# Patient Record
Sex: MALE | Race: WHITE
[De-identification: names, ages, dates, MRNs, and addresses within clinical notes are randomized per-mention and may not be internally consistent; named-entity substitution may affect disease eponyms.]

---

## 2017-02-22 ENCOUNTER — HOSPITAL ENCOUNTER (EMERGENCY)
Dept: HOSPITAL 61 - ER | Age: 57
Discharge: HOME | End: 2017-02-22
Payer: COMMERCIAL

## 2017-02-22 VITALS — SYSTOLIC BLOOD PRESSURE: 120 MMHG | DIASTOLIC BLOOD PRESSURE: 77 MMHG

## 2017-02-22 VITALS — WEIGHT: 195 LBS | BODY MASS INDEX: 27.3 KG/M2 | HEIGHT: 71 IN
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
## 2017-02-22 DIAGNOSIS — Z88.5: ICD-10-CM
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
## 2017-02-22 DIAGNOSIS — J45.909: ICD-10-CM

## 2017-02-22 DIAGNOSIS — I10: ICD-10-CM
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
## 2017-02-22 DIAGNOSIS — J44.9: ICD-10-CM
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
## 2017-02-22 DIAGNOSIS — F12.10: ICD-10-CM

## 2017-02-22 DIAGNOSIS — M70.32: Primary | ICD-10-CM

## 2017-02-22 PROCEDURE — 73080 X-RAY EXAM OF ELBOW: CPT

## 2017-02-22 PROCEDURE — 10060 I&D ABSCESS SIMPLE/SINGLE: CPT

## 2017-02-22 NOTE — PHYS DOC
Past Medical History


Past Medical History:  Asthma, COPD, Hypertension, Other


Additional Past Medical Histor:  EPILEPSY, "LYMPH NODES IN THROAT", HEAD TRAUMA


Past Surgical History:  Cholecystectomy, Other


Additional Past Surgical Histo:  3 THROAT SX


Alcohol Use:  Occasionally


Drug Use:  Marijuana





Adult General


Chief Complaint


Chief Complaint:  left elbow injury/pain





HPI


HPI


Patient is a 56  year old male who is complaining of pain and injury to the 

left elbow. The patient says 2 days ago he had had "the flu" had been having 

some vomiting and he had dehydrated and had a syncopal episode. He was helped 

up by family members and went to bed, since then that is all better and he is 

now able to eat and drink. When he fell, he hurt his left elbow, and now it is 

swollen and red and has some drainage.





Denies fever or chills. He does have chronic back pain and uses lidocaine 

patches, also was prescribed hydrocodone and Flexeril for that.





Review of Systems


Review of Systems





Constitutional: Denies fever or chills []


Respiratory: Denies cough or shortness of breath []


Cardiovascular: Denies chest pain


GI: Denies abdominal pain, nausea, vomiting, bloody stools or diarrhea []


: Denies dysuria or hematuria []


Musculoskeletal: Left elbow pain is his only bone or joint pain


Integument: Denies rash or skin lesions []


Neurologic: Denies headache, focal weakness or sensory changes []





Current Medications


Current Medications





 Current Medications








 Medications


  (Trade)  Dose


 Ordered  Sig/Toño  Start Time


 Stop Time Status Last Admin


Dose Admin


 


 Lidocaine/Sodium


 Bicarbonate


  (Buffered


 Lidocaine 1%)  20 ml  1X  ONCE  2/22/17 17:45


 2/22/17 17:46 DC 2/22/17 17:53


20 ML


 


 Trimethoprim/


 Sulfamethoxazole


  (Bactrim Ds)  1 tab  1X  ONCE  2/22/17 18:15


 2/22/17 18:16 DC 2/22/17 18:17


1 TAB











Allergies


Allergies





 Allergies








Coded Allergies Type Severity Reaction Last Updated Verified


 


  morphine Adverse Reaction Mild NAUSEA 2/22/17 Yes











Physical Exam


Physical Exam





Constitutional: Well developed, well nourished, no acute distress, non-toxic 

appearance. Alert, mentating normally.


HENT: Normocephalic, atraumatic, bilateral external ears normal,  nose normal. [

]


Eyes: conjunctiva normal, no discharge. [] 


Neck: Normal range of motion, no stridor. [] 


Skin: Warm, dry, no erythema, no rash. [] 


Extremities: Left elbow: Swelling, redness, tenderness over the olecranon 

bursa. There is a 3 mm break in the skin which is draining some purulent thin 

drainage. There is no significant bony tenderness of the elbow. The patient has 

good range of motion of the elbow except he is not able to fully extend it 

minus about 10. Rotation of the forearm is normal. Range of motion of the 

elbow does not appear to be limited by pain or joint effusion.


Neurologic: Alert and oriented X 3, normal motor function, normal sensory 

function, no focal deficits noted. []





Current Patient Data


Vital Signs





 Vital Signs








  Date Time  Temp Pulse Resp B/P Pulse Ox O2 Delivery O2 Flow Rate FiO2


 


2/22/17 18:19  80 20 120/77 97 Room Air  


 


2/22/17 17:25 97.5       





 97.5       











EKG


EKG


[]





Radiology/Procedures


Radiology/Procedures


Three-view x-ray of the left elbow read by me. No fracture, no dislocation, no 

foreign body, no other bony injury. Soft tissue swelling over the olecranon. []








Procedure: 


I  and D of septic olecranon bursitis by me


The area was prepped with Betadine, anesthetized with 1% lidocaine buffered, a 

2 cm incision was made over the area of most fluctuance. Thin purulent/bloody 

drainage was expressed. This was not a complicated abscess and there were no 

loculations. The area was dressed by nursing staff.





Course & Med Decision Making


Course & Med Decision Making


Pertinent Labs and Imaging studies reviewed. (See chart for details)





48-year-old male who had a fall a couple of days ago and hurt his left elbow, 

now has redness, swelling, pain over the olecranon bursa, which does not 

include the elbow joint. He does have some drainage of purulent material out of 

this small skin opening so I elected to go ahead and I and D the likely septic 

olecranon bursa. There was not take pus or loculations. The procedure while 

well. We will start him on Bactrim DS for surrounding cellulitis. Talked to him 

about warm soaks for several days, follow-up with orthopedics if not improving 

rapidly.





[]





Dragon Disclaimer


Dragon Disclaimer


This electronic medical record was generated, in whole or in part, using a 

voice recognition dictation system.





Departure


Departure


Impression:  


 Primary Impression:  


 Septic olecranon bursitis of left elbow


Disposition:  01 HOME, SELF-CARE


Condition:  IMPROVED


Referrals:  


UNKNOWN PCP NAME (PCP)








DESEAN ENCARNACION MD


Patient Instructions:  Abscess, Easy-to-Read





Additional Instructions:


We opened the infection and let the pus out. It's important to keep it from 

closing up, keep it open and allow it to drain. 3-4 times a day, soak in warm 

soapy water for 10-15 minutes. Gently use a washcloth to help keep it open and 

allow it to drain.





Bactrim, antibiotic for infection.





If not improving in 2-3 days, or if worse, see orthopedics, phone number was 

provided.


Scripts


Sulfamethoxazole/Trimethoprim (Bactrim Ds Tablet)1 Each Tablet1 Tab PO BID #14 

TAB


   For infection of elbow


   Prov:TIFFANY WASHINGTON MD         2/22/17








TIFFANY WASHINGTON MD Feb 22, 2017 18:14

## 2017-02-23 NOTE — RAD
EXAM: Left elbow 3 views.



HISTORY: Fall with left elbow swelling.



COMPARISON: None.



FINDINGS: There is soft tissue swelling at the olecranon. There appear to be

small foci of soft tissue gas in the region. There is no underlying cortical

erosion or clear radiopaque foreign body.



There is no joint effusion. No fractures are identified. Joint spaces and

alignment appear maintained.



IMPRESSION:

1. Findings consistent with olecranon bursitis. There is a small focus of gas

in the region of swelling. Correlate for an overlying laceration or soft

tissue infection.

## 2017-04-30 ENCOUNTER — HOSPITAL ENCOUNTER (EMERGENCY)
Dept: HOSPITAL 61 - ER | Age: 57
LOS: 1 days | Discharge: LEFT BEFORE DISCHARGE | End: 2017-05-01
Payer: COMMERCIAL

## 2017-04-30 VITALS — WEIGHT: 180 LBS | HEIGHT: 71 IN | BODY MASS INDEX: 25.2 KG/M2

## 2017-04-30 DIAGNOSIS — Z98.890: ICD-10-CM

## 2017-04-30 DIAGNOSIS — R22.1: Primary | ICD-10-CM

## 2017-04-30 DIAGNOSIS — G40.909: ICD-10-CM

## 2017-04-30 DIAGNOSIS — E87.6: ICD-10-CM

## 2017-04-30 DIAGNOSIS — J44.9: ICD-10-CM

## 2017-04-30 DIAGNOSIS — Z90.49: ICD-10-CM

## 2017-04-30 DIAGNOSIS — R00.0: ICD-10-CM

## 2017-04-30 DIAGNOSIS — D72.829: ICD-10-CM

## 2017-04-30 DIAGNOSIS — I10: ICD-10-CM

## 2017-04-30 DIAGNOSIS — F12.10: ICD-10-CM

## 2017-04-30 DIAGNOSIS — R11.2: ICD-10-CM

## 2017-04-30 DIAGNOSIS — Z88.5: ICD-10-CM

## 2017-04-30 DIAGNOSIS — J02.9: ICD-10-CM

## 2017-04-30 LAB
ANION GAP SERPL CALC-SCNC: 15 MMOL/L (ref 6–14)
ANISOCYTOSIS BLD QL SMEAR: (no result)
APTT BLD: 23 SEC (ref 24–38)
BASOPHILS # BLD AUTO: 0.1 X10^3/UL (ref 0–0.2)
BASOPHILS NFR BLD: 1 % (ref 0–3)
BUN SERPL-MCNC: 14 MG/DL (ref 8–26)
CALCIUM SERPL-MCNC: 8.7 MG/DL (ref 8.5–10.1)
CHLORIDE SERPL-SCNC: 94 MMOL/L (ref 98–107)
CO2 SERPL-SCNC: 25 MMOL/L (ref 21–32)
CREAT SERPL-MCNC: 1 MG/DL (ref 0.7–1.3)
EOSINOPHIL NFR BLD: 1 % (ref 0–3)
ERYTHROCYTE [DISTWIDTH] IN BLOOD BY AUTOMATED COUNT: 21 % (ref 11.5–14.5)
GFR SERPLBLD BASED ON 1.73 SQ M-ARVRAT: 76.7 ML/MIN
GLUCOSE SERPL-MCNC: 153 MG/DL (ref 70–99)
HCT VFR BLD CALC: 43.9 % (ref 39–53)
HGB BLD-MCNC: 15.2 G/DL (ref 13–17.5)
INR PPP: 1 (ref 0.8–1.1)
LYMPHOCYTES # BLD: 3.2 X10^3/UL (ref 1–4.8)
LYMPHOCYTES NFR BLD AUTO: 25 % (ref 24–48)
MCH RBC QN AUTO: 33 PG (ref 25–35)
MCHC RBC AUTO-ENTMCNC: 35 G/DL (ref 31–37)
MCV RBC AUTO: 96 FL (ref 79–100)
MONOCYTES NFR BLD: 6 % (ref 0–9)
NEUTROPHILS NFR BLD AUTO: 68 % (ref 31–73)
PLATELET # BLD AUTO: 322 X10^3/UL (ref 140–400)
PLATELET # BLD EST: ADEQUATE 10*3/UL
POTASSIUM SERPL-SCNC: 2.8 MMOL/L (ref 3.5–5.1)
PROTHROMBIN TIME: 12.8 SEC (ref 11.7–14)
RBC # BLD AUTO: 4.55 X10^6/UL (ref 4.3–5.7)
SODIUM SERPL-SCNC: 134 MMOL/L (ref 136–145)
WBC # BLD AUTO: 12.7 X10^3/UL (ref 4–11)

## 2017-04-30 PROCEDURE — 70491 CT SOFT TISSUE NECK W/DYE: CPT

## 2017-04-30 PROCEDURE — 84484 ASSAY OF TROPONIN QUANT: CPT

## 2017-04-30 PROCEDURE — 85007 BL SMEAR W/DIFF WBC COUNT: CPT

## 2017-04-30 PROCEDURE — S0028 INJECTION, FAMOTIDINE, 20 MG: HCPCS

## 2017-04-30 PROCEDURE — 93005 ELECTROCARDIOGRAM TRACING: CPT

## 2017-04-30 PROCEDURE — 85730 THROMBOPLASTIN TIME PARTIAL: CPT

## 2017-04-30 PROCEDURE — 85027 COMPLETE CBC AUTOMATED: CPT

## 2017-04-30 PROCEDURE — 96372 THER/PROPH/DIAG INJ SC/IM: CPT

## 2017-04-30 PROCEDURE — 83880 ASSAY OF NATRIURETIC PEPTIDE: CPT

## 2017-04-30 PROCEDURE — 36415 COLL VENOUS BLD VENIPUNCTURE: CPT

## 2017-04-30 PROCEDURE — 80048 BASIC METABOLIC PNL TOTAL CA: CPT

## 2017-04-30 PROCEDURE — 96375 TX/PRO/DX INJ NEW DRUG ADDON: CPT

## 2017-04-30 PROCEDURE — 99285 EMERGENCY DEPT VISIT HI MDM: CPT

## 2017-04-30 PROCEDURE — 71010: CPT

## 2017-04-30 PROCEDURE — 96374 THER/PROPH/DIAG INJ IV PUSH: CPT

## 2017-04-30 PROCEDURE — 85610 PROTHROMBIN TIME: CPT

## 2017-04-30 PROCEDURE — 96361 HYDRATE IV INFUSION ADD-ON: CPT

## 2017-04-30 NOTE — PHYS DOC
Past Medical History


Past Medical History:  Asthma, COPD, Hypertension, Other


Additional Past Medical Histor:  EPILEPSY, "LYMPH NODES IN THROAT", HEAD TRAUMA


Past Surgical History:  Cholecystectomy, Other


Additional Past Surgical Histo:  3 THROAT SX


Alcohol Use:  Occasionally


Drug Use:  Marijuana





Adult General


Chief Complaint


Chief Complaint:  SHORTNESS OF BREATH





HPI


HPI


Patient is a 56  year old male who presents with throat swelling. The patient 

states he has been sick all day today with "at least 100" episodes of vomiting. 

He states about one hour prior to arrival he had sensation of throat swelling 

with voice changes and shortness of breath. Reports sore throat. Denies fevers 

or chills, hematemesis, cough, chest pain, abdominal pain, diarrhea, 

hematochezia or melena. Reports previous history of similar symptoms associated 

with allergic reaction. Denies use of any new medications or other 

environmental exposures today. His medication list includes lisinopril. He 

states he has history of throat cancer with multiple surgeries to remove 

tumors. He has not undergone chemotherapy or radiation, states there are more 

masses that are supposed to be removed.





Review of Systems


Review of Systems


Constitutional: Denies fever or chills 


Eyes: Denies change in visual acuity


HENT: Denies nasal congestion, reports sore throat and throat swelling


Respiratory: Denies cough, reports shortness of breath 


Cardiovascular:  Denies chest pain or edema


GI: Reports nausea and vomiting. Denies abdominal pain, bloody stools or 

diarrhea


: Denies dysuria or hematuria 


Musculoskeletal: Denies back pain or joint pain 


Integument: Denies rash or skin lesions 


Neurologic: Denies headache, focal weakness or sensory changes





Current Medications


Current Medications





 Current Medications








 Medications


  (Trade)  Dose


 Ordered  Sig/Toño  Start Time


 Stop Time Status Last Admin


Dose Admin


 


 Diphenhydramine


 HCl


  (Benadryl)  25 mg  1X  ONCE  4/30/17 23:00


 4/30/17 23:01 DC 4/30/17 22:38


25 MG


 


 Epinephrine HCl


  (Adrenalin)  1 mg  STK-MED ONCE  4/30/17 22:24


 4/30/17 22:25 DC  


 


 


 Epinephrine HCl


 0.3 mg  0.3 mg  1X  ONCE  4/30/17 23:00


 4/30/17 23:01 DC 4/30/17 22:39


0.3 MG


 


 Famotidine


  (Pepcid)  20 mg  1X  ONCE  4/30/17 23:00


 4/30/17 23:01 DC 4/30/17 22:39


20 MG


 


 Info


  (Do NOT chart on


 this entry -- for


 MONITORING)  1 each  PRN DAILY  PRN  4/30/17 23:30


 5/1/17 00:57 DC  


 


 


 Iohexol


  (Omnipaque 300


 Mg/ml)  70 ml  1X  ONCE  4/30/17 23:30


 4/30/17 23:31 DC 4/30/17 23:35


70 ML


 


 Methylprednisolone


 Sodium Succinate


  (Solu-Medrol


 125mg Vial)  125 mg  1X  ONCE  4/30/17 23:00


 4/30/17 23:01 DC 4/30/17 22:38


125 MG


 


 Sodium Chloride


  (Iv Sodium


 Chloride 0.9%


 1000ml Bag)  1,000 ml @ 


 1,000 mls/hr  Q1H  4/30/17 23:00


 4/30/17 23:59 DC 4/30/17 22:40


1,000 MLS/HR











Allergies


Allergies





 Allergies








Coded Allergies Type Severity Reaction Last Updated Verified


 


  morphine Adverse Reaction Mild NAUSEA 2/22/17 Yes











Physical Exam


Physical Exam


Constitutional: Well developed, well nourished, no acute distress, non-toxic 

appearance. 


HENT: Normocephalic, atraumatic, bilateral external ears normal, oropharynx 

moist, nose normal.  no lip or tongue swelling, posterior oropharynx edematous 

but no tonsillar enlargement or exudate.  voice is muffled.


Eyes: PERRLA, EOMI, conjunctiva normal, no discharge.


Neck: supple, no stridor.  no palpable masses.  


Cardiovascular:  RRR, no murmurs, no edema. 


Lungs & Thorax:  LCTAB, no wheezing, no respiratory distress.


Abdomen: soft, nontender, nondistended.


Skin: Warm, dry, no erythema, no rash.


Back: No tenderness.


Extremities: No tenderness, no edema. 


Neurologic: Alert and oriented X 3, no focal deficits noted. 


Psychologic: Affect normal, judgement normal, mood normal.





Current Patient Data


Vital Signs





 Vital Signs








  Date Time  Temp Pulse Resp B/P Pulse Ox O2 Delivery O2 Flow Rate FiO2


 


5/1/17 00:30  103  178/102 96 Room Air  


 


4/30/17 22:20 98.2  20     





 98.2       








Lab Values





 Laboratory Tests








Test


  4/30/17


22:50


 


White Blood Count


  12.7x10^3/uL


(4.0-11.0)  H


 


Red Blood Count


  4.55x10^6/uL


(4.30-5.70)


 


Hemoglobin


  15.2g/dL


(13.0-17.5)


 


Hematocrit


  43.9%


(39.0-53.0)


 


Mean Corpuscular Volume 96fL ()  


 


Mean Corpuscular Hemoglobin 33pg (25-35)  


 


Mean Corpuscular Hemoglobin


Concent 35g/dL (31-37)


 


 


Red Cell Distribution Width


  21.0%


(11.5-14.5)  H


 


Platelet Count


  322x10^3/uL


(140-400)


 


Neutrophils (%) (Auto) 68% (31-73)  


 


Lymphocytes (%) (Auto) 25% (24-48)  


 


Monocytes (%) (Auto) 6% (0-9)  


 


Eosinophils (%) (Auto) 1% (0-3)  


 


Basophils (%) (Auto) 1% (0-3)  


 


Neutrophils # (Auto)


  8.7x10^3uL


(1.8-7.7)  H


 


Lymphocytes # (Auto)


  3.2x10^3/uL


(1.0-4.8)


 


Monocytes # (Auto)


  0.7x10^3/uL


(0.0-1.1)


 


Eosinophils # (Auto)


  0.1x10^3/uL


(0.0-0.7)


 


Basophils # (Auto)


  0.1x10^3/uL


(0.0-0.2)


 


Platelet Estimate


  Adequate


(ADEQUATE)


 


Anisocytosis Mod  


 


Prothrombin Time


  12.8SEC


(11.7-14.0)


 


Prothrombin Time INR 1.0 (0.8-1.1)  


 


PTT


  23SEC (24-38)


L


 


Sodium Level


  134mmol/L


(136-145)  L


 


Potassium Level


  2.8mmol/L


(3.5-5.1)  *L


 


Chloride Level


  94mmol/L


()  L


 


Carbon Dioxide Level


  25mmol/L


(21-32)


 


Anion Gap 15 (6-14)  H


 


Blood Urea Nitrogen


  14mg/dL (8-26)


 


 


Creatinine


  1.0mg/dL


(0.7-1.3)


 


Estimated GFR


(Cockcroft-Gault) 76.7  


 


 


Glucose Level


  153mg/dL


(70-99)  H


 


Calcium Level


  8.7mg/dL


(8.5-10.1)


 


Troponin I Quantitative


  < 0.017ng/mL


(0.000-0.055)


 


NT-Pro-B-Type Natriuretic


Peptide 613pg/mL


(0-124)  H





 Laboratory Tests


4/30/17 22:50








 Laboratory Tests


4/30/17 22:50











EKG


EKG


interpreted by me:  sinus tachycardia rate 115, no ST elevation, ST depression 

in V4-V5, normal intervals, no ectopy.[]





Radiology/Procedures


Radiology/Procedures


PROCEDURE: CT SOFT TISSUE NECK W/CONTRAST











PROCEDURE 


CT soft tissue neck with contrast 


 


HISTORY 


Neck swelling history throat cancer 


 


TECHNIQUE 


Axial helical images the neck were obtained after the administration of 70


milliliters of IV Omni 300 contrast and axial coronal and sagittal 


reconstruction was performed. 


 


 


FINDINGS 


There is no prevertebral soft tissue swelling. The thyroid appears normal.


The epiglottis appears normal. 


There has been prior removal of the right submandibular salivary gland. 


There is diffuse soft tissue swelling loss of fat soft tissue planes from 


the level of the hyoid to the oropharynx is especially seen on the to the 


right oropharynx there is loss of the soft tissue planes. 


There is no abnormal lymph nodes. 


 


IMPRESSION 


Diffuse soft tissue swelling loss of fat soft tissue planes from the level


of hyoid to the oropharynx is especially seen on the right some of this 


could be scar tissue however recurrent or new neoplasm is probable. 


 


Electronically signed by: Julieth Sharif MD (May 01, 2017 00:34:10)














DICTATED and SIGNED BY:     JULIETH SHARIF III, MD


DATE:     05/01/17 0034





CXR:  interpreted by me:  no cardiomegaly, no infiltrate, no pneumothorax.[]





Course & Med Decision Making


Course & Med Decision Making


Pertinent Labs and Imaging studies reviewed. (See chart for details)


The patient presents with throat swelling.  No distress but voice is obviously 

muffled.  Could be related to lisinopril angioedema, other allergic reaction, 

infectious process, malignancy, possibly but less likely esophageal irritation 

from vomiting.  Gave epinephrine, solumedrol, benadryl, pepcid, & IV fluids.  

Patient felt better, voice maybe slightly less muffled.  CT shows diffuse 

swelling likely recurrence of malignancy.  Discussed with the patient.  

Previous surgeries were at [facility].  Informed him there is no ENT service here, 

recommended transfer for close monitoring.  He states he feels better, "has 

been dealing with this for years," & would prefer discharge home.  We discussed 

risks of leaving which include worsening condition, undiagnosed condition, 

possibly death.  He voiced understanding, is A&Ox3, not suicidal, & would like 

to sign out AMA with full understanding of risks.  Recommend discontinue 

lisinopril until evaluated by PCP, gave prescriptions for prednisone, benadryl, 

pepcid for possible allergic component.  Recommend follow up as soon as 

possible with ENT.  Come back if desiring further treatment or for worsening 

condition.  Discharged against medical advice but in improved condition.  


[]





Dragon Disclaimer


Dragon Disclaimer


This electronic medical record was generated, in whole or in part, using a 

voice recognition dictation system.





Departure


Departure


Impression:  


 Primary Impression:  


 Throat swelling


 Additional Impressions:  


 Tachycardia


 Essential hypertension


 Hypokalemia


 Leukocytosis


 Nausea & vomiting


Disposition:  07 AGAINST MEDICAL ADVICE


Condition:  IMPROVED


Referrals:  


UNKNOWN PCP NAME (PCP)


Patient Instructions:  Angioedema, Easy-to-Read, Throat Cancer, Cancer of the 

Oropharynx





Additional Instructions:


You were seen in the emergency department today for throat swelling.  You 

improved with treatments here.  The CT scan showed new swelling on the right 

side of your throat that could be cancer or scar tissue.  It is also possible 

that this could be allergic reaction to your lisinopril.  We recommended 

admission to the hospital for close monitoring.  You preferred to go home.  

Risks of leaving include worsening condition, undiagnosed condition, possibly 

death.  Please follow up as soon as possible with an ear, nose, & throat 

doctor.  Do not take lisinopril until seen by your doctor.  Take the other 

prescribed medications.  Return to the emergency department for worsening 

throat swelling, difficulty breathing or swallowing, or any otherwise worsening 

condition.


Scripts


Diphenhydramine Hcl (Benadryl)25 Mg Sphftoq42 Mg PO Q6HRS #30 CAP


   Prov:DAYANARA LIVINGSTON MD         5/1/17


Famotidine (Pepcid)20 Mg Sawmmc15 Mg PO HS #5 TAB


   Prov:DAYANARA LIVINGSTON MD         5/1/17


Prednisone 50 Mg Tablet1 Tab PO DAILY #5 TAB


   Prov:DAYANARA LIVINGSTON MD         5/1/17





Problem Qualifiers








DAYANARA LIVINGSTON MD Apr 30, 2017 22:47

## 2017-05-01 VITALS
DIASTOLIC BLOOD PRESSURE: 102 MMHG | SYSTOLIC BLOOD PRESSURE: 178 MMHG | SYSTOLIC BLOOD PRESSURE: 178 MMHG | DIASTOLIC BLOOD PRESSURE: 102 MMHG

## 2017-05-01 NOTE — RAD
Indication shortness of air.



A single view of the chest was obtained. Comparison is made to an examination

11/15/2010.



The heart and pulmonary vessels appear normal. The lungs are clear. There is

no pleural fluid or pneumothorax. A substantial change when compared to the

previous exam is not seen.



IMPRESSION: No acute or focal process. No significant change

## 2017-05-01 NOTE — RAD
PROCEDURE 

CT soft tissue neck with contrast 

 

HISTORY 

Neck swelling history throat cancer 

 

TECHNIQUE 

Axial helical images the neck were obtained after the administration of 70

milliliters of IV Omni 300 contrast and axial coronal and sagittal 

reconstruction was performed. 

 

 

FINDINGS 

There is no prevertebral soft tissue swelling. The thyroid appears normal.

The epiglottis appears normal. 

There has been prior removal of the right submandibular salivary gland. 

There is diffuse soft tissue swelling loss of fat soft tissue planes from 

the level of the hyoid to the oropharynx is especially seen on the to the 

right oropharynx there is loss of the soft tissue planes. 

There is no abnormal lymph nodes. 

 

IMPRESSION 

Diffuse soft tissue swelling loss of fat soft tissue planes from the level

of hyoid to the oropharynx is especially seen on the right some of this 

could be scar tissue however recurrent or new neoplasm is probable. 

 

Electronically signed by: Mitchell Sharif MD (May 01, 2017 00:34:10)

## 2017-05-01 NOTE — EKG
[facility]

               [address] [place] [address]

Test Date:    2017               Test Time:    22:24:23

Pat Name:     DYLLAN YOUSSEF            Department:   

Patient ID:   [id number]           Room:          

Gender:       M                        Technician:   

:          1960               Requested By: DAYANARA LIVINGSTON

Order Number: [id number]            Reading MD:   Ginette Chandler

                                 Measurements

Intervals                              Axis          

Rate:         115                      P:            -118

SC:           82                       QRS:          37

QRSD:         92                       T:            49

QT:           322                                    

QTc:          447                                    

                           Interpretive Statements

SINUS  RHYTHM



ST T ABNORMALITY IN ANTEROSEPTAL LEADS

ABNORMAL ECG



Electronically Signed On 2017 21:06:40 CDT by Ginette Chandler

## 2017-05-03 ENCOUNTER — HOSPITAL ENCOUNTER (OUTPATIENT)
Dept: HOSPITAL 61 - LAB | Age: 57
Discharge: HOME | End: 2017-05-03
Attending: INTERNAL MEDICINE
Payer: COMMERCIAL

## 2017-05-03 DIAGNOSIS — B18.2: Primary | ICD-10-CM

## 2017-05-03 PROCEDURE — 36415 COLL VENOUS BLD VENIPUNCTURE: CPT

## 2017-12-14 ENCOUNTER — HOSPITAL ENCOUNTER (EMERGENCY)
Dept: HOSPITAL 61 - ER | Age: 57
Discharge: HOME | End: 2017-12-14
Payer: COMMERCIAL

## 2017-12-14 VITALS — SYSTOLIC BLOOD PRESSURE: 92 MMHG | DIASTOLIC BLOOD PRESSURE: 60 MMHG

## 2017-12-14 VITALS — HEIGHT: 71 IN | WEIGHT: 172 LBS | BODY MASS INDEX: 24.08 KG/M2
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
## 2017-12-14 DIAGNOSIS — I10: ICD-10-CM

## 2017-12-14 DIAGNOSIS — F12.10: ICD-10-CM

## 2017-12-14 DIAGNOSIS — Z88.5: ICD-10-CM

## 2017-12-14 DIAGNOSIS — S09.90XA: ICD-10-CM
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
## 2017-12-14 DIAGNOSIS — Z90.49: ICD-10-CM

## 2017-12-14 DIAGNOSIS — S01.21XA: Primary | ICD-10-CM

## 2017-12-14 DIAGNOSIS — F10.229: ICD-10-CM

## 2017-12-14 DIAGNOSIS — J44.9: ICD-10-CM

## 2017-12-14 DIAGNOSIS — F17.210: ICD-10-CM
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
## 2017-12-14 DIAGNOSIS — G40.909: ICD-10-CM

## 2017-12-14 LAB
ALBUMIN SERPL-MCNC: 3.6 G/DL (ref 3.4–5)
ALBUMIN/GLOB SERPL: 1.1 {RATIO} (ref 1–1.7)
ALP SERPL-CCNC: 85 U/L (ref 46–116)
ALT SERPL-CCNC: 121 U/L (ref 16–63)
ANION GAP SERPL CALC-SCNC: 12 MMOL/L (ref 6–14)
AST SERPL-CCNC: 310 U/L (ref 15–37)
BASOPHILS # BLD AUTO: 0.1 X10^3/UL (ref 0–0.2)
BASOPHILS NFR BLD: 1 % (ref 0–3)
BILIRUB SERPL-MCNC: 0.6 MG/DL (ref 0.2–1)
BUN SERPL-MCNC: 8 MG/DL (ref 8–26)
BUN/CREAT SERPL: 9 (ref 6–20)
CALCIUM SERPL-MCNC: 8.5 MG/DL (ref 8.5–10.1)
CHLORIDE SERPL-SCNC: 97 MMOL/L (ref 98–107)
CO2 SERPL-SCNC: 24 MMOL/L (ref 21–32)
CREAT SERPL-MCNC: 0.9 MG/DL (ref 0.7–1.3)
EOSINOPHIL NFR BLD: 3 % (ref 0–3)
ERYTHROCYTE [DISTWIDTH] IN BLOOD BY AUTOMATED COUNT: 18.2 % (ref 11.5–14.5)
GFR SERPLBLD BASED ON 1.73 SQ M-ARVRAT: 86.7 ML/MIN
GLOBULIN SER-MCNC: 3.3 G/DL (ref 2.2–3.8)
GLUCOSE SERPL-MCNC: 116 MG/DL (ref 70–99)
HCT VFR BLD CALC: 38 % (ref 39–53)
HGB BLD-MCNC: 12.7 G/DL (ref 13–17.5)
LYMPHOCYTES # BLD: 1.8 X10^3/UL (ref 1–4.8)
LYMPHOCYTES NFR BLD AUTO: 34 % (ref 24–48)
MCH RBC QN AUTO: 33 PG (ref 25–35)
MCHC RBC AUTO-ENTMCNC: 33 G/DL (ref 31–37)
MCV RBC AUTO: 99 FL (ref 79–100)
MONOCYTES NFR BLD: 9 % (ref 0–9)
NEUTROPHILS NFR BLD AUTO: 53 % (ref 31–73)
PLATELET # BLD AUTO: 198 X10^3/UL (ref 140–400)
POTASSIUM SERPL-SCNC: 3.3 MMOL/L (ref 3.5–5.1)
PROT SERPL-MCNC: 6.9 G/DL (ref 6.4–8.2)
RBC # BLD AUTO: 3.82 X10^6/UL (ref 4.3–5.7)
SODIUM SERPL-SCNC: 133 MMOL/L (ref 136–145)
WBC # BLD AUTO: 5.3 X10^3/UL (ref 4–11)

## 2017-12-14 PROCEDURE — 72125 CT NECK SPINE W/O DYE: CPT

## 2017-12-14 PROCEDURE — 70486 CT MAXILLOFACIAL W/O DYE: CPT

## 2017-12-14 PROCEDURE — 96365 THER/PROPH/DIAG IV INF INIT: CPT

## 2017-12-14 PROCEDURE — 36415 COLL VENOUS BLD VENIPUNCTURE: CPT

## 2017-12-14 PROCEDURE — 96374 THER/PROPH/DIAG INJ IV PUSH: CPT

## 2017-12-14 PROCEDURE — 85025 COMPLETE CBC W/AUTO DIFF WBC: CPT

## 2017-12-14 PROCEDURE — 99285 EMERGENCY DEPT VISIT HI MDM: CPT

## 2017-12-14 PROCEDURE — 90471 IMMUNIZATION ADMIN: CPT

## 2017-12-14 PROCEDURE — 96375 TX/PRO/DX INJ NEW DRUG ADDON: CPT

## 2017-12-14 PROCEDURE — 93005 ELECTROCARDIOGRAM TRACING: CPT

## 2017-12-14 PROCEDURE — 90715 TDAP VACCINE 7 YRS/> IM: CPT

## 2017-12-14 PROCEDURE — 70450 CT HEAD/BRAIN W/O DYE: CPT

## 2017-12-14 PROCEDURE — 71010: CPT

## 2017-12-14 PROCEDURE — 84484 ASSAY OF TROPONIN QUANT: CPT

## 2017-12-14 PROCEDURE — 12011 RPR F/E/E/N/L/M 2.5 CM/<: CPT

## 2017-12-14 PROCEDURE — 80053 COMPREHEN METABOLIC PANEL: CPT

## 2017-12-14 RX ADMIN — FENTANYL CITRATE PRN MCG: 50 INJECTION INTRAMUSCULAR; INTRAVENOUS at 17:32

## 2017-12-14 RX ADMIN — FENTANYL CITRATE PRN MCG: 50 INJECTION INTRAMUSCULAR; INTRAVENOUS at 19:30

## 2017-12-14 NOTE — RAD
CT Head W/O Contrast:

 

History: SYNCOPE, FALL, FACIAL INJURY, NO PRIOR

 

Comparison: none

 

Axial images were obtained without contrast.

 

The gray and white matter appears normal and symmetrical for the patients 

age.  There is no mass effect, extraaxial fluid collections or 

hydrocephalus.  There is no gross bleed.  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 

cerebellar lobe infarct. There is no focal loss of gray-white matter 

distinction to suggest acute ischemia, i.e. stroke.

 

Impression:  No acute findings.

 

End impression

 

 

CT maxillofacial without contrast

 

History: Pain status post fall

 

Axial helical images of the face were obtained without contrast. Axial, 

sagittal and coronal reconstruction was performed.

 

The nasal septum is mildly deviated to the right. The ostiomeatal 

complexes are narrow but patent. The paranasal sinuses are clear. The 

visualized osseous structures appear intact. The orbits appear normal.

 

Impression:

 

No acute findings.

 

End impression

 

 

CT C-Spine without contrast:

 

Clinical History: SYNCOPE, FALL, FACIAL INJURY, NO PRIOR

 

Technique:  Axial helical images of the cervical spine were obtained 

without contrast, axial coronal and sagittal reconstruction was performed.

 

 

Findings:

 

There is no loss of vertebral body stature.  There is no prevertebral soft

tissue swelling.  The vertebral bodies are well aligned.  The C1-C2 

relationship is normal. The visualized osseous structures appear normal. 

Evaluation of the central canal is limited without contrast. There is 

multiple posterior disc bulges resulting in flattening of the thecal sac. 

There does not appear to be gross flattening of the cervical cord. There 

is moderate narrowing of multiple neuroforamen.

 

Impression:  

 

No acute findings.  Clinical correlation suggested.

 

PQRS Compliance Statement:

 

One or more of the following individualized dose reduction techniques were

utilized for this examination:  

1. Automated exposure control  

2. Adjustment of the mA and/or kV according to patient size  

3. Use of iterative reconstruction technique

 

Electronically signed by: Mitchell Sharif III, MD (12/14/2017 6:18 PM) [facility]

## 2017-12-14 NOTE — RAD
CT Head W/O Contrast:

 

History: SYNCOPE, FALL, FACIAL INJURY, NO PRIOR

 

Comparison: none

 

Axial images were obtained without contrast.

 

The gray and white matter appears normal and symmetrical for the patients 

age.  There is no mass effect, extraaxial fluid collections or 

hydrocephalus.  There is no gross bleed.  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 

cerebellar lobe infarct. There is no focal loss of gray-white matter 

distinction to suggest acute ischemia, i.e. stroke.

 

Impression:  No acute findings.

 

End impression

 

 

CT maxillofacial without contrast

 

History: Pain status post fall

 

Axial helical images of the face were obtained without contrast. Axial, 

sagittal and coronal reconstruction was performed.

 

The nasal septum is mildly deviated to the right. The ostiomeatal 

complexes are narrow but patent. The paranasal sinuses are clear. The 

visualized osseous structures appear intact. The orbits appear normal.

 

Impression:

 

No acute findings.

 

End impression

 

 

CT C-Spine without contrast:

 

Clinical History: SYNCOPE, FALL, FACIAL INJURY, NO PRIOR

 

Technique:  Axial helical images of the cervical spine were obtained 

without contrast, axial coronal and sagittal reconstruction was performed.

 

 

Findings:

 

There is no loss of vertebral body stature.  There is no prevertebral soft

tissue swelling.  The vertebral bodies are well aligned.  The C1-C2 

relationship is normal. The visualized osseous structures appear normal. 

Evaluation of the central canal is limited without contrast. There is 

multiple posterior disc bulges resulting in flattening of the thecal sac. 

There does not appear to be gross flattening of the cervical cord. There 

is moderate narrowing of multiple neuroforamen.

 

Impression:  

 

No acute findings.  Clinical correlation suggested.

 

PQRS Compliance Statement:

 

One or more of the following individualized dose reduction techniques were

utilized for this examination:  

1. Automated exposure control  

2. Adjustment of the mA and/or kV according to patient size  

3. Use of iterative reconstruction technique

 

Electronically signed by: Mitchell Sharif III, MD (12/14/2017 6:18 PM) [facility]

## 2017-12-14 NOTE — PHYS DOC
Past Medical History


Past Medical History:  Asthma, COPD, Hypertension, Seizure, Other


Additional Past Medical Histor:  EPILEPSY, "LYMPH NODES IN THROAT", HEAD TRAUMA


Past Surgical History:  Cholecystectomy, Other


Additional Past Surgical Histo:  3 THROAT SX


Additional Information:  


5 cigarettes daily


Alcohol Use:  Heavy


Additional Information:  


whiskey drink of choice


Drug Use:  Marijuana





Adult General


Chief Complaint


Chief Complaint:  SYNCOPE





HPI


HPI





Patient is a 56  year old male who presents with syncope and face injury.





Patient was drinking alcohol today and had an episode of syncope. He does not 

remember the events leading up to it. He was drinking heavily today. He fell 

and hit his face on something (perhsp the piano). He thinks he lost 

consciousness. Currently he has had hurts and his nose hurts. No back pain. No 

chest pain. He has no shortness of breath. He's been under a lot of stress he 

says the drinking more. He drinks on a daily basis.  Pain is to his nose and 

face, it is constant. No alleviating or exacerbating symptoms.





Review of Systems


Review of Systems





Constitutional: Denies fever or chills 


Eyes: Denies change in visual acuity, redness, or eye pain 


HENT: Obvious nose injury.


Respiratory: Denies cough or shortness of breath 


Cardiovascular: No additional information not addressed in HPI 


GI: Denies abdominal pain, nausea, vomiting, bloody stools or diarrhea 


: Denies dysuria or hematuria 


Musculoskeletal: Denies back pain or joint pain 


Integument: Denies rash or skin lesions 


Neurologic: Denies headache (other than face pain) , focal weakness or sensory 

changes 


Endocrine: Denies polyuria or polydipsia 





All other systems were reviewed and found to be within normal limits, except as 

documented in this note.





Current Medications


Current Medications





Current Medications








 Medications


  (Trade)  Dose


 Ordered  Sig/Toño  Start Time


 Stop Time Status Last Admin


Dose Admin


 


 Diphtheria/


 Tetanus/Acell


 Pertussis


  (Boostrix)  0.5 ml  ONCE ONCE  12/14/17 17:30


 12/14/17 17:31 DC 12/14/17 17:33


0.5 ML


 


 Diphtheria/


 Tetanus/Acell


 Pertussis


  (Infanrix Dtap


 Vial)  0.5 ml  ONCE ONCE  12/14/17 17:30


 12/14/17 17:31 UNV  


 


 


 Fentanyl Citrate


  (Fentanyl 2ml


 Vial)  25 mcg  PRN Q15MIN  PRN  12/14/17 17:15


 12/14/17 19:39 DC 12/14/17 19:30


25 MCG


 


 Lidocaine/Sodium


 Bicarbonate


  (Buffered


 Lidocaine 1%)  20 ml  1X  ONCE  12/14/17 17:30


 12/14/17 17:31 DC 12/14/17 17:31


20 ML


 


 Sodium Chloride  1,000 ml @ 


 1,000 mls/hr  Q1H  12/14/17 17:12


 12/14/17 18:11 DC 12/14/17 17:32


1,000 MLS/HR


 


 Thiamine HCl 100


 mg/Dextrose  51 ml @ 


 102 mls/hr  1X  ONCE  12/14/17 17:19


 12/14/17 17:48 DC 12/14/17 17:31


102 MLS/HR











Allergies


Allergies





Allergies








Coded Allergies Type Severity Reaction Last Updated Verified


 


  morphine Adverse Reaction Mild NAUSEA 2/22/17 Yes











Physical Exam


Physical Exam





Constitutional: Well developed, well nourished, no acute distress, non-toxic 

appearance. 


HENT: There appears to be a nasal deformity with a 1 cm laceration to the 

anterior bridge of the nose. No septal hematoma.


Eyes: PERRLA, EOMI, conjunctiva normal, no discharge. 


Neck: Normal range of motion, no tenderness, supple, no stridor. 


Cardiovascular:Heart rate regular rhythm, no murmur 


Lungs & Thorax:  Bilateral breath sounds clear to auscultation 


Abdomen: Bowel sounds normal, soft, no tenderness, no masses, no pulsatile 

masses. 


Skin: Warm, dry, no erythema, no rash. 


Back: No CTLS tenderness.


Extremities: No tenderness, no cyanosis, no clubbing, ROM intact, no edema. 


Neurologic: Alert and oriented X 3, normal motor function, normal sensory 

function, no focal deficits noted. 


Psychologic: Affect normal, judgement normal, mood normal. He does appear to be 

under the influence of alcohol and admits to that.





Current Patient Data


Vital Signs





 Vital Signs








  Date Time  Temp Pulse Resp B/P (MAP) Pulse Ox O2 Delivery O2 Flow Rate FiO2


 


12/14/17 19:30      Room Air  


 


12/14/17 19:00  50  92/60 (71) 97   


 


12/14/17 18:32   16     


 


12/14/17 16:43 97.9       





 97.9       








Lab Values





 Laboratory Tests








Test


  12/14/17


16:44


 


White Blood Count


  5.3 x10^3/uL


(4.0-11.0)


 


Red Blood Count


  3.82 x10^6/uL


(4.30-5.70)  L


 


Hemoglobin


  12.7 g/dL


(13.0-17.5)  L


 


Hematocrit


  38.0 %


(39.0-53.0)  L


 


Mean Corpuscular Volume


  99 fL ()


 


 


Mean Corpuscular Hemoglobin 33 pg (25-35)  


 


Mean Corpuscular Hemoglobin


Concent 33 g/dL


(31-37)


 


Red Cell Distribution Width


  18.2 %


(11.5-14.5)  H


 


Platelet Count


  198 x10^3/uL


(140-400)


 


Neutrophils (%) (Auto) 53 % (31-73)  


 


Lymphocytes (%) (Auto) 34 % (24-48)  


 


Monocytes (%) (Auto) 9 % (0-9)  


 


Eosinophils (%) (Auto) 3 % (0-3)  


 


Basophils (%) (Auto) 1 % (0-3)  


 


Neutrophils # (Auto)


  2.8 x10^3uL


(1.8-7.7)


 


Lymphocytes # (Auto)


  1.8 x10^3/uL


(1.0-4.8)


 


Monocytes # (Auto)


  0.5 x10^3/uL


(0.0-1.1)


 


Eosinophils # (Auto)


  0.2 x10^3/uL


(0.0-0.7)


 


Basophils # (Auto)


  0.1 x10^3/uL


(0.0-0.2)


 


Sodium Level


  133 mmol/L


(136-145)  L


 


Potassium Level


  3.3 mmol/L


(3.5-5.1)  L


 


Chloride Level


  97 mmol/L


()  L


 


Carbon Dioxide Level


  24 mmol/L


(21-32)


 


Anion Gap 12 (6-14)  


 


Blood Urea Nitrogen


  8 mg/dL (8-26)


 


 


Creatinine


  0.9 mg/dL


(0.7-1.3)


 


Estimated GFR


(Cockcroft-Gault) 86.7  


 


 


BUN/Creatinine Ratio 9 (6-20)  


 


Glucose Level


  116 mg/dL


(70-99)  H


 


Calcium Level


  8.5 mg/dL


(8.5-10.1)


 


Total Bilirubin


  0.6 mg/dL


(0.2-1.0)


 


Aspartate Amino Transferase


(AST) 310 U/L


(15-37)  H


 


Alanine Aminotransferase (ALT)


  121 U/L


(16-63)  H


 


Alkaline Phosphatase


  85 U/L


()


 


Troponin I Quantitative


  < 0.017 ng/mL


(0.000-0.055)


 


Total Protein


  6.9 g/dL


(6.4-8.2)


 


Albumin


  3.6 g/dL


(3.4-5.0)


 


Albumin/Globulin Ratio 1.1 (1.0-1.7)  





 Laboratory Tests


12/14/17 16:44








 Laboratory Tests


12/14/17 16:44











EKG


EKG


EKG is NSR without acute signs of ischemia rate of 79.[]


Interpretation Time:


1710





Radiology/Procedures


Radiology/Procedures


[]CT Head W/O Contrast:


 


History: SYNCOPE, FALL, FACIAL INJURY, NO PRIOR


 


Comparison: none


 


Axial images were obtained without contrast.


 


The gray and white matter appears normal and symmetrical for the patients 


age.  There is no mass effect, extraaxial fluid collections or 


hydrocephalus.  There is no gross bleed.  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 


cerebellar lobe infarct. There is no focal loss of gray-white matter 


distinction to suggest acute ischemia, i.e. stroke.


 


Impression:  No acute findings.


 


End impression


 


 


CT maxillofacial without contrast


 


History: Pain status post fall


 


Axial helical images of the face were obtained without contrast. Axial, 


sagittal and coronal reconstruction was performed.


 


The nasal septum is mildly deviated to the right. The ostiomeatal 


complexes are narrow but patent. The paranasal sinuses are clear. The 


visualized osseous structures appear intact. The orbits appear normal.


 


Impression:


 


No acute findings.


 


End impression


 


 


CT C-Spine without contrast:


 


Clinical History: SYNCOPE, FALL, FACIAL INJURY, NO PRIOR


 


Technique:  Axial helical images of the cervical spine were obtained 


without contrast, axial coronal and sagittal reconstruction was performed.


 


 


Findings:


 


There is no loss of vertebral body stature.  There is no prevertebral soft


tissue swelling.  The vertebral bodies are well aligned.  The C1-C2 


relationship is normal. The visualized osseous structures appear normal. 


Evaluation of the central canal is limited without contrast. There is 


multiple posterior disc bulges resulting in flattening of the thecal sac. 


There does not appear to be gross flattening of the cervical cord. There 


is moderate narrowing of multiple neuroforamen.


 


Impression:  


 


No acute findings.  Clinical correlation suggested.


 


PQRS Compliance Statement:


 


One or more of the following individualized dose reduction techniques were


utilized for this examination:  


1. Automated exposure control  


2. Adjustment of the mA and/or kV according to patient size  


3. Use of iterative reconstruction technique


 


Electronically signed by: Julieth Sharif III, MD (12/14/2017 6:18 PM) [facility]














DICTATED and SIGNED BY:     JULIETH SHARIF III, MD


DATE:     12/14/17 1813





CC: NATHALY BENNETT MD; UNKNOWN PCP NAME ~





CXR normal int by me.  1921





Course & Med Decision Making


Course & Med Decision Making


Pertinent Labs and Imaging studies reviewed. (See chart for details)





Laceration repair note. 1 cm laceration of the bridge in the nose at the 

junction the forehead. Area cleansed with normal saline and betadine around 

wound. 0.5 mL to 1% Xylocaine used. Closed with 5 times 6-0 Prolene sutures. 

Patient tolerated well with good cosmesis. Sterile technique used.  Length of 

laceration 1 cm.





CT head/cervical and face done for head injury.  HEAD CT NOT DONE FOR ROUTINE 

SYNCOPE BUT RATHER FOR HEAD INJURY.





CT head, face and cervical is normal. Labs significant for alcoholic liver 

disease.  He has been observed and is stable.  I will dosmiss with clear return 

warnings.





TOld suture removal in 5 days.





Dx:  head injury, nasal injury, face laceration, alcohol intoxication, 

alcoholism 





Dismissed in stable condition.





Dragon Disclaimer


Dragon Disclaimer


This electronic medical record was generated, in whole or in part, using a 

voice recognition dictation system.





Departure


Departure


Referrals:  


UNKNOWN PCP NAME (PCP)











NATHALY BENNETT MD Dec 14, 2017 17:24

## 2017-12-14 NOTE — EKG
[facility]

              [address] [place] [address]

Test Date:    2017               Test Time:    17:08:59

Pat Name:     DYLLAN YOUSSEF            Department:   

Patient ID:   [id number]           Room:          

Gender:       M                        Technician:   

:          1960               Requested By: NATHALY BENNETT

Order Number: [id number]            Reading MD:     

                                 Measurements

Intervals                              Axis          

Rate:         78                       P:            31

WV:           176                      QRS:          15

QRSD:         90                       T:            8

QT:           406                                    

QTc:          466                                    

                           Interpretive Statements

SINUS RHYTHM

NON SPECIFIC T ABNORMALITY

NON SPECIFIC ST DEPRESSION

BORDERLINE ECG

No previous ECG available for comparison

## 2017-12-15 NOTE — RAD
Indication: Syncope, fall



Technique: Upright portable chest radiograph was obtained.  Comparison is from

April 30, 2017.



Findings: The lungs are clear.  The cardiopulmonary silhouette is within

normal limits.  The bony structures are intact. Leads overlie the patient.



Impression:

No acute thoracic findings.

## 2018-06-14 ENCOUNTER — HOSPITAL ENCOUNTER (EMERGENCY)
Dept: HOSPITAL 61 - ER | Age: 58
Discharge: HOME | End: 2018-06-14
Payer: COMMERCIAL

## 2018-06-14 DIAGNOSIS — F10.920: ICD-10-CM

## 2018-06-14 DIAGNOSIS — G40.909: ICD-10-CM

## 2018-06-14 DIAGNOSIS — I10: ICD-10-CM

## 2018-06-14 DIAGNOSIS — Z88.5: ICD-10-CM
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
## 2018-06-14 DIAGNOSIS — S20.229A: ICD-10-CM
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
## 2018-06-14 DIAGNOSIS — S22.42XA: Primary | ICD-10-CM
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
## 2018-06-14 DIAGNOSIS — S00.03XA: ICD-10-CM

## 2018-06-14 DIAGNOSIS — S93.402A: ICD-10-CM

## 2018-06-14 DIAGNOSIS — Z90.49: ICD-10-CM

## 2018-06-14 DIAGNOSIS — F12.10: ICD-10-CM
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
## 2018-06-14 DIAGNOSIS — S13.9XXA: ICD-10-CM

## 2018-06-14 DIAGNOSIS — S30.0XXA: ICD-10-CM

## 2018-06-14 DIAGNOSIS — S93.602A: ICD-10-CM

## 2018-06-14 DIAGNOSIS — J44.9: ICD-10-CM

## 2018-06-14 LAB
AMPHETAMINE/METHAMPHETAMINE: (no result)
BACTERIA,URINE: 0 /HPF
BARBITURATES: (no result)
BENZODIAZEPINES: (no result)
BILIRUBIN,URINE: NEGATIVE
CANNABINOIDS: (no result)
CLARITY,URINE: CLEAR
COCAINE: (no result)
COLOR,URINE: YELLOW
ETHANOL, URINE: (no result)
GLUCOSE,URINE: NEGATIVE MG/DL
HYALINE CASTS, URINE: (no result) /HPF
METHADONE: (no result)
NITRITE,URINE: NEGATIVE
OPIATES: (no result)
PH,URINE: 5.5
PHENCYCLIDINE: (no result)
PROTEIN,URINE: NEGATIVE MG/DL
RBC,URINE: 0 /HPF (ref 0–2)
SPECIFIC GRAVITY,URINE: 1.01
UROBILINOGEN,URINE: 1 MG/DL
WBC,URINE: (no result) /HPF (ref 0–4)

## 2018-06-14 PROCEDURE — 70486 CT MAXILLOFACIAL W/O DYE: CPT

## 2018-06-14 PROCEDURE — 99285 EMERGENCY DEPT VISIT HI MDM: CPT

## 2018-06-14 PROCEDURE — 72125 CT NECK SPINE W/O DYE: CPT

## 2018-06-14 PROCEDURE — 73630 X-RAY EXAM OF FOOT: CPT

## 2018-06-14 PROCEDURE — 70450 CT HEAD/BRAIN W/O DYE: CPT

## 2018-06-14 PROCEDURE — 71101 X-RAY EXAM UNILAT RIBS/CHEST: CPT

## 2018-06-14 PROCEDURE — 81001 URINALYSIS AUTO W/SCOPE: CPT

## 2018-06-14 PROCEDURE — 72131 CT LUMBAR SPINE W/O DYE: CPT

## 2018-06-14 PROCEDURE — 72128 CT CHEST SPINE W/O DYE: CPT

## 2018-06-14 PROCEDURE — 80307 DRUG TEST PRSMV CHEM ANLYZR: CPT

## 2018-06-14 PROCEDURE — 73610 X-RAY EXAM OF ANKLE: CPT

## 2018-06-14 RX ADMIN — CYCLOBENZAPRINE HYDROCHLORIDE 1 MG: 10 TABLET, FILM COATED ORAL at 14:12

## 2018-12-08 ENCOUNTER — HOSPITAL ENCOUNTER (EMERGENCY)
Dept: HOSPITAL 61 - ER | Age: 58
Discharge: HOME | End: 2018-12-08
Payer: COMMERCIAL

## 2018-12-08 VITALS — DIASTOLIC BLOOD PRESSURE: 79 MMHG | SYSTOLIC BLOOD PRESSURE: 157 MMHG

## 2018-12-08 VITALS — BODY MASS INDEX: 24.5 KG/M2 | HEIGHT: 71 IN | WEIGHT: 175 LBS

## 2018-12-08 DIAGNOSIS — G40.909: ICD-10-CM

## 2018-12-08 DIAGNOSIS — F10.129: ICD-10-CM

## 2018-12-08 DIAGNOSIS — Z88.5: ICD-10-CM

## 2018-12-08 DIAGNOSIS — J44.9: ICD-10-CM

## 2018-12-08 DIAGNOSIS — I10: ICD-10-CM

## 2018-12-08 DIAGNOSIS — M25.422: Primary | ICD-10-CM
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
## 2018-12-08 DIAGNOSIS — M10.9: ICD-10-CM

## 2018-12-08 PROCEDURE — 99284 EMERGENCY DEPT VISIT MOD MDM: CPT

## 2018-12-08 PROCEDURE — 93971 EXTREMITY STUDY: CPT

## 2018-12-08 PROCEDURE — 36415 COLL VENOUS BLD VENIPUNCTURE: CPT

## 2018-12-08 PROCEDURE — 84550 ASSAY OF BLOOD/URIC ACID: CPT

## 2018-12-08 PROCEDURE — 73080 X-RAY EXAM OF ELBOW: CPT

## 2018-12-08 NOTE — PHYS DOC
Past Medical History


Past Medical History:  Arthritis, Asthma, COPD, Hypertension, Hepatitis, Seizure

, Other


Additional Past Medical Histor:  EPILEPSY, "LYMPH NODES IN THROAT", HEAD TRAUMA

, hepatitis C, alcoholism


Past Surgical History:  Cholecystectomy, Other


Additional Past Surgical Histo:  3 THROAT SX


Alcohol Use:  Heavy


Drug Use:  Marijuana





Adult General


Chief Complaint


Chief Complaint:  ELBOW PROBLEM





HPI


HPI


57-year-old male who was recently admitted to the hospital for alcohol 

intoxication and metabolic encephalopathy presents with report of left elbow 

pain and swelling and right great toe pain and swelling with some redness. 

Patient denies known trauma. Denies chest pain or shortness of air. Denies 

history of gout. Denies fever or chills.





Review of Systems


Review of Systems





Constitutional: Denies fever or chills []


Eyes: Denies change in visual acuity, redness, or eye pain []


HENT: Denies nasal congestion or sore throat []


Respiratory: Denies cough or shortness of breath []


Cardiovascular: Denies chest pain or pleuritic pain


GI: Denies abdominal pain, nausea, vomiting, or diarrhea []


: Denies dysuria or hematuria []


Musculoskeletal: Reports left elbow pain and right great toe pain


Integument: Denies rash; reports redness and swelling to right great toe 

reports swelling to left elbow and forearm, 


Neurologic: Denies headache, focal weakness or sensory changes 





Complete systems were reviewed and found to be within normal limits, except as 

documented in this note.





Current Medications


Current Medications





Current Medications








 Medications


  (Trade)  Dose


 Ordered  Sig/Toño  Start Time


 Stop Time Status Last Admin


Dose Admin


 


 Dexamethasone


  (Decadron)  10 mg  1X  ONCE  12/8/18 08:15


 12/8/18 08:16 DC 12/8/18 09:03


10 MG











Allergies


Allergies





Allergies








Coded Allergies Type Severity Reaction Last Updated Verified


 


  morphine Adverse Reaction Mild NAUSEA 2/22/17 Yes











Physical Exam


Physical Exam





Constitutional: Well developed, well nourished, no acute distress, non-toxic 

appearance. []


HENT: Normocephalic, atraumatic,, oropharynx moist


Eyes: Conjunctiva normal, no discharge. [] 


Neck: Normal range of motion,  supple


Cardiovascular: Heart rate regular rhythm, no murmur []


Lungs & Thorax:  Bilateral breath sounds clear to auscultation [] 


Skin: Warm, dry, increased warmth and redness noted to right great toe DIP, 

similar findings to less degree to left great toe DIP


Extremities:  Left elbow pain on ROM, some mild swelling noted to left elbow, 

left radial pulse +2, CR < 2 sec, no pain with flexion/extension of left 

fingers and wrist, some discomfort with supination/pronation of left forearm


Neurologic: Alert and oriented X 3, no focal deficits noted. []


Psychologic: Affect normal, judgement normal, mood normal. []





Current Patient Data


Vital Signs





 Vital Signs








  Date Time  Temp Pulse Resp B/P (MAP) Pulse Ox O2 Delivery O2 Flow Rate FiO2


 


12/8/18 08:02 97.9 96 18 122/80 (94) 99 Room Air  





 97.9       








Lab Values





 Laboratory Tests








Test


 12/8/18


08:40


 


Uric Acid


 6.0 mg/dL


(3.5-7.2)











EKG


EKG


[]





Radiology/Procedures


Radiology/Procedures


PROCEDURE: ELBOW LEFT 3V





Left elbow x-rays 3 views


 


HISTORY: Left elbow pain and swelling.


 


FINDINGS: The lateral films are somewhat oblique may decrease sensitivity 


to detect subtle elevation of the fat pads in light of this no obvious 


joint effusion is evident. No fracture or dislocation. No lytic or 


sclerotic bone lesion. There may be mild soft tissue edema posterior elbow


and swelling.


 


IMPRESSION: No acute osseous injury. Posterior elbow soft tissue edema and


mild swelling.


 


Electronically signed by: Noah Simpson MD (12/8/2018 8:56 AM) [facility]





Course & Med Decision Making


Course & Med Decision Making


Pertinent Labs and Imaging studies reviewed. (See chart for details)





She presents with report of left elbow pain and swelling combined with redness 

and swelling of her right great toe. Concern for possible gout. Uric acid WNL. 

Left elbow x-ray without acute fracture or dislocation. Venous Doppler negative 

for acute DVT.  Notation of joint effusion appreciated. Symptomatic treatment 

provided with oral steroid. Patient stable for discharge with outpatient follow-

up with PCP/orthopedics.  Orthopedic referral provided.  Discussed findings and 

plan with patient, who acknowledges understanding and agreement.





Dragon Disclaimer


Dragon Disclaimer


This electronic medical record was generated, in whole or in part, using a 

voice recognition dictation system.





Splinting


Splinting :  


   Location:  Left elbow


   Pre-Made Type:  Ace bandage


   Pre-Proc Neuro Vasc Exam:  normal


   Post-Proc Neuro Vasc Exam:  normal, unchanged from pre-exam





Departure


Departure


Impression:  


 Primary Impression:  


 Gout


 Additional Impressions:  


 Elbow pain, left


 Joint effusion of elbow


Disposition:  01 HOME, SELF-CARE


Condition:  STABLE


Referrals:  


UNKNOWN PCP NAME (PCP)


Patient Instructions:  Elbow Effusion-Brief, Gout, Easy-to-Read


Scripts


Prednisone (PREDNISONE) 20 Mg Tablet


2 TAB PO DAILY, #10 TAB


   Start tomorrow Sunday 12/9/18


   Prov: CHARAN SCALES DO         12/8/18





Problem Qualifiers








 Primary Impression:  


 Gout


 Gout site:  toe  Gout etiology:  unspecified cause  Chronicity:  acute  

Laterality:  right  Qualified Codes:  M10.9 - Gout, unspecified


 Additional Impressions:  


 Joint effusion of elbow


 Laterality:  left  Qualified Codes:  M25.422 - Effusion, left elbow








CHARAN SCALES DO Dec 8, 2018 08:18

## 2018-12-08 NOTE — RAD
Left elbow x-rays 3 views

 

HISTORY: Left elbow pain and swelling.

 

FINDINGS: The lateral films are somewhat oblique may decrease sensitivity 

to detect subtle elevation of the fat pads in light of this no obvious 

joint effusion is evident. No fracture or dislocation. No lytic or 

sclerotic bone lesion. There may be mild soft tissue edema posterior elbow

and swelling.

 

IMPRESSION: No acute osseous injury. Posterior elbow soft tissue edema and

mild swelling.

 

Electronically signed by: Noah Simpson MD (12/8/2018 8:56 AM) [facility]

## 2018-12-08 NOTE — RAD
Left upper extremity venous duplex Doppler ultrasound

 

HISTORY: Left elbow pain and swelling.

 

TECHNIQUE: Grayscale and duplex Doppler sonography were utilized.

 

FINDINGS: No DVT evident by grayscale imaging with and color Doppler blood

flow and normal waveforms of the left internal jugular vein, subclavian 

vein, axillary vein, cephalic vein, basilic vein, brachial veins, radial 

veins and ulnar veins. There is augmentation of blood flow throughout the 

veins documented. There is mild joint fluid at the elbow noted.

 

IMPRESSION: Negative left upper extremity for DVT. Joint effusion of the 

left elbow.

 

Electronically signed by: Noah Simpson MD (12/8/2018 10:26 AM) 

[facility]